# Patient Record
Sex: MALE | Race: BLACK OR AFRICAN AMERICAN | NOT HISPANIC OR LATINO | URBAN - NONMETROPOLITAN AREA
[De-identification: names, ages, dates, MRNs, and addresses within clinical notes are randomized per-mention and may not be internally consistent; named-entity substitution may affect disease eponyms.]

---

## 2022-01-06 ENCOUNTER — APPOINTMENT (OUTPATIENT)
Dept: GENERAL RADIOLOGY | Facility: HOSPITAL | Age: 37
End: 2022-01-06

## 2022-01-06 ENCOUNTER — HOSPITAL ENCOUNTER (EMERGENCY)
Facility: HOSPITAL | Age: 37
Discharge: HOME | End: 2022-01-06
Admitting: EMERGENCY MEDICINE

## 2022-01-06 VITALS
HEIGHT: 67 IN | HEART RATE: 78 BPM | DIASTOLIC BLOOD PRESSURE: 95 MMHG | OXYGEN SATURATION: 98 % | BODY MASS INDEX: 25.11 KG/M2 | SYSTOLIC BLOOD PRESSURE: 147 MMHG | RESPIRATION RATE: 18 BRPM | TEMPERATURE: 98.8 F | WEIGHT: 160 LBS

## 2022-01-06 DIAGNOSIS — M25.552 LEFT HIP PAIN: Primary | ICD-10-CM

## 2022-01-06 DIAGNOSIS — M94.0 COSTOCHONDRITIS: ICD-10-CM

## 2022-01-06 DIAGNOSIS — M25.512 ACUTE PAIN OF LEFT SHOULDER: ICD-10-CM

## 2022-01-06 PROCEDURE — 71101 X-RAY EXAM UNILAT RIBS/CHEST: CPT

## 2022-01-06 PROCEDURE — 73030 X-RAY EXAM OF SHOULDER: CPT

## 2022-01-06 PROCEDURE — 73502 X-RAY EXAM HIP UNI 2-3 VIEWS: CPT

## 2022-01-06 PROCEDURE — 99283 EMERGENCY DEPT VISIT LOW MDM: CPT

## 2022-01-06 RX ORDER — ACETAMINOPHEN 500 MG
1000 TABLET ORAL ONCE
Status: COMPLETED | OUTPATIENT
Start: 2022-01-06 | End: 2022-01-06

## 2022-01-06 RX ORDER — ORPHENADRINE CITRATE 100 MG/1
100 TABLET, EXTENDED RELEASE ORAL 2 TIMES DAILY PRN
Qty: 14 TABLET | Refills: 0 | Status: SHIPPED | OUTPATIENT
Start: 2022-01-06

## 2022-01-06 RX ADMIN — ACETAMINOPHEN 1000 MG: 500 TABLET ORAL at 21:25

## 2022-01-07 NOTE — DISCHARGE INSTRUCTIONS
Call one of the offices below to establish a primary care provider.  If you are unable to get an appointment and feel it is an emergency and need to be seen immediately please return to the Emergency Department.    Call one of the office below to set up a primary care provider.    Dr. Bhavin Hadley                                                                                                       [address] [facility] [address]  [phone]    Dr. Torres, Dr. JOSSUE Echeverria, Dr. BELL Echeverria ([facility])  [address] [facility] [address]  [phone]    Dr. Davila, Dr. Gilmore, Dr. Cooper ([facility])  [address] [facility] [address]  [phone]    Dr. Gallagher  [address] [facility] [address]  [phone]    Dr. Dong, Dr. Max, Dr. Reyes, Dr. Orlando ([facility])  [address] [place] [address] [address]  [facility] [address]  [phone]    Dr. Tracey Peterson  [address] [facility] [place] [address]  [phone]    Dr. Fabiola Alevs  [address]   [address]  [facility] [address]  [phone]    Dr. Hadley  [address] [facility] [address]  [phone]    Dr. Lara, Dr. Stroud  [address] [facility] [place] [address]  [phone]    Dr. Rodriguez  [address]                                                              [address]  [facility] [address]  [phone]    Dr. Han  [address] [facility] [address]  [phone]    Dr. Lilian Wagner  [address]  [address]  [place] [address]  [phone]    Dr. Landa and [facility]   [address] [facility]  [address]  [place] [address]  [phone]
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
